# Patient Record
Sex: FEMALE | Race: OTHER
[De-identification: names, ages, dates, MRNs, and addresses within clinical notes are randomized per-mention and may not be internally consistent; named-entity substitution may affect disease eponyms.]

---

## 2020-10-10 ENCOUNTER — HOSPITAL ENCOUNTER (OUTPATIENT)
Dept: HOSPITAL 61 - 3 SO LND | Age: 25
Setting detail: OBSERVATION
Discharge: HOME | End: 2020-10-10
Attending: OBSTETRICS & GYNECOLOGY | Admitting: OBSTETRICS & GYNECOLOGY
Payer: COMMERCIAL

## 2020-10-10 DIAGNOSIS — O26.852: Primary | ICD-10-CM

## 2020-10-10 DIAGNOSIS — Z79.899: ICD-10-CM

## 2020-10-10 DIAGNOSIS — O99.342: ICD-10-CM

## 2020-10-10 DIAGNOSIS — F41.9: ICD-10-CM

## 2020-10-10 DIAGNOSIS — Z3A.26: ICD-10-CM

## 2020-10-10 LAB
AMPHETAMINE/METHAMPHETAMINE: (no result)
APTT PPP: YELLOW S
BACTERIA #/AREA URNS HPF: (no result) /HPF
BARBITURATES UR-MCNC: (no result) UG/ML
BASOPHILS # BLD AUTO: 0 X10^3/UL (ref 0–0.2)
BASOPHILS NFR BLD: 1 % (ref 0–3)
BENZODIAZ UR-MCNC: (no result) UG/L
BILIRUB UR QL STRIP: NEGATIVE
CANNABINOIDS UR-MCNC: (no result) UG/L
COCAINE UR-MCNC: (no result) NG/ML
EOSINOPHIL NFR BLD: 0.2 X10^3/UL (ref 0–0.7)
EOSINOPHIL NFR BLD: 2 % (ref 0–3)
ERYTHROCYTE [DISTWIDTH] IN BLOOD BY AUTOMATED COUNT: 13.8 % (ref 11.5–14.5)
FIBRINOGEN PPP-MCNC: CLEAR MG/DL
HCT VFR BLD CALC: 33.4 % (ref 36–47)
HGB BLD-MCNC: 11.6 G/DL (ref 12–15.5)
LYMPHOCYTES # BLD: 1.4 X10^3/UL (ref 1–4.8)
LYMPHOCYTES NFR BLD AUTO: 15 % (ref 24–48)
MCH RBC QN AUTO: 29 PG (ref 25–35)
MCHC RBC AUTO-ENTMCNC: 35 G/DL (ref 31–37)
MCV RBC AUTO: 84 FL (ref 79–100)
METHADONE SERPL-MCNC: (no result) NG/ML
MONO #: 0.6 X10^3/UL (ref 0–1.1)
MONOCYTES NFR BLD: 6 % (ref 0–9)
NEUT #: 7.7 X10^3/UL (ref 1.8–7.7)
NEUTROPHILS NFR BLD AUTO: 77 % (ref 31–73)
NITRITE UR QL STRIP: NEGATIVE
OPIATES UR-MCNC: (no result) NG/ML
PCP SERPL-MCNC: (no result) MG/DL
PH UR STRIP: 7.5 [PH]
PLATELET # BLD AUTO: 246 X10^3/UL (ref 140–400)
PROT UR STRIP-MCNC: NEGATIVE MG/DL
RBC # BLD AUTO: 3.99 X10^6/UL (ref 3.5–5.4)
RBC #/AREA URNS HPF: 0 /HPF (ref 0–2)
UROBILINOGEN UR-MCNC: 0.2 MG/DL
WBC # BLD AUTO: 9.9 X10^3/UL (ref 4–11)

## 2020-10-10 PROCEDURE — 86900 BLOOD TYPING SEROLOGIC ABO: CPT

## 2020-10-10 PROCEDURE — 81001 URINALYSIS AUTO W/SCOPE: CPT

## 2020-10-10 PROCEDURE — 76815 OB US LIMITED FETUS(S): CPT

## 2020-10-10 PROCEDURE — 86850 RBC ANTIBODY SCREEN: CPT

## 2020-10-10 PROCEDURE — G0379 DIRECT REFER HOSPITAL OBSERV: HCPCS

## 2020-10-10 PROCEDURE — 96372 THER/PROPH/DIAG INJ SC/IM: CPT

## 2020-10-10 PROCEDURE — G0378 HOSPITAL OBSERVATION PER HR: HCPCS

## 2020-10-10 PROCEDURE — 36415 COLL VENOUS BLD VENIPUNCTURE: CPT

## 2020-10-10 PROCEDURE — 87086 URINE CULTURE/COLONY COUNT: CPT

## 2020-10-10 PROCEDURE — 80307 DRUG TEST PRSMV CHEM ANLYZR: CPT

## 2020-10-10 PROCEDURE — 86901 BLOOD TYPING SEROLOGIC RH(D): CPT

## 2020-10-10 PROCEDURE — 85025 COMPLETE CBC W/AUTO DIFF WBC: CPT

## 2020-10-10 NOTE — RAD
Exam: Ultrasound OB limited

 

Indication: Pregnancy, fetal weight and SOPHIA

 

Technique: Real-time grayscale and color Doppler images of the pelvis were

obtained by the department sonographer.

 

Comparisons: None

 

FINDINGS:

Within the uterus there is a single live intrauterine gestation with fetal

heart rate measured at 139 bpm.

 

Fetal measurements as follows:

BPD: 6.9 cm corresponding to 27 weeks 5 days

Head circumference: 24.1 cm corresponding to 26 weeks 1 day

Abdominal circumference: 22.2 cm corresponding to 26 weeks 4 days

Femur length: 4.8 cm corresponding to 26 weeks 0 days

Estimated fetal weight 937 g

SOPHIA measured at 12.7 cm.

 

Placenta is posterior there is increase vascular flow posterior to the 

placenta 

 

IMPRESSION:

1.  Single live intrauterine gestation measuring 26 4 days by current 

ultrasound. Correlate with LMP.

2.  Placenta is posterior with some posterior hypervascular area in 

thickened appearance. This may relate to contraction. Correlate for 

bleeding. Short-term follow-up imaging is recommended to reassess.

3.  SOPHIA measured at 12.7 cm.

 

Electronically signed by: Moi Cohen MD (10/10/2020 6:26 PM) [id number]

## 2021-02-04 ENCOUNTER — HOSPITAL ENCOUNTER (OUTPATIENT)
Dept: HOSPITAL 63 - LAB | Age: 26
End: 2021-02-04
Attending: PSYCHIATRY & NEUROLOGY
Payer: COMMERCIAL

## 2021-02-04 DIAGNOSIS — E05.90: Primary | ICD-10-CM

## 2021-02-04 LAB
T3 SERPL-MCNC: 105 NG/DL (ref 71–180)
T4 SERPL-MCNC: 7.4 UG/DL (ref 4.5–12)

## 2021-02-04 PROCEDURE — 84480 ASSAY TRIIODOTHYRONINE (T3): CPT

## 2021-02-04 PROCEDURE — 84436 ASSAY OF TOTAL THYROXINE: CPT

## 2021-02-04 PROCEDURE — 84443 ASSAY THYROID STIM HORMONE: CPT

## 2021-02-04 PROCEDURE — 36415 COLL VENOUS BLD VENIPUNCTURE: CPT
